# Patient Record
Sex: FEMALE | Race: AMERICAN INDIAN OR ALASKA NATIVE
[De-identification: names, ages, dates, MRNs, and addresses within clinical notes are randomized per-mention and may not be internally consistent; named-entity substitution may affect disease eponyms.]

---

## 2018-03-21 ENCOUNTER — HOSPITAL ENCOUNTER (EMERGENCY)
Dept: HOSPITAL 5 - ED | Age: 33
LOS: 1 days | Discharge: LEFT BEFORE DISCHARGE | End: 2018-03-22
Payer: SELF-PAY

## 2018-03-21 VITALS — DIASTOLIC BLOOD PRESSURE: 93 MMHG | SYSTOLIC BLOOD PRESSURE: 167 MMHG

## 2018-03-21 DIAGNOSIS — R10.9: Primary | ICD-10-CM

## 2018-03-21 LAB
ALBUMIN SERPL-MCNC: 3.7 G/DL (ref 3.9–5)
ALT SERPL-CCNC: 13 UNITS/L (ref 7–56)
BASOPHILS # (AUTO): 0.1 K/MM3 (ref 0–0.1)
BASOPHILS NFR BLD AUTO: 0.9 % (ref 0–1.8)
BUN SERPL-MCNC: 9 MG/DL (ref 7–17)
BUN/CREAT SERPL: 13 %
CALCIUM SERPL-MCNC: 8.8 MG/DL (ref 8.4–10.2)
EOSINOPHIL # BLD AUTO: 0.2 K/MM3 (ref 0–0.4)
EOSINOPHIL NFR BLD AUTO: 3.9 % (ref 0–4.3)
HCT VFR BLD CALC: 36.6 % (ref 30.3–42.9)
HEMOLYSIS INDEX: 38
HGB BLD-MCNC: 12.1 GM/DL (ref 10.1–14.3)
LYMPHOCYTES # BLD AUTO: 1.9 K/MM3 (ref 1.2–5.4)
LYMPHOCYTES NFR BLD AUTO: 31.3 % (ref 13.4–35)
MCH RBC QN AUTO: 29 PG (ref 28–32)
MCHC RBC AUTO-ENTMCNC: 33 % (ref 30–34)
MCV RBC AUTO: 87 FL (ref 79–97)
MONOCYTES # (AUTO): 0.4 K/MM3 (ref 0–0.8)
MONOCYTES % (AUTO): 7 % (ref 0–7.3)
PLATELET # BLD: 341 K/MM3 (ref 140–440)
RBC # BLD AUTO: 4.18 M/MM3 (ref 3.65–5.03)

## 2018-03-21 PROCEDURE — 36415 COLL VENOUS BLD VENIPUNCTURE: CPT

## 2018-03-21 PROCEDURE — 85025 COMPLETE CBC W/AUTO DIFF WBC: CPT

## 2018-03-21 PROCEDURE — 80053 COMPREHEN METABOLIC PANEL: CPT

## 2018-03-21 PROCEDURE — 81001 URINALYSIS AUTO W/SCOPE: CPT

## 2018-03-21 PROCEDURE — 84703 CHORIONIC GONADOTROPIN ASSAY: CPT

## 2018-03-22 LAB
BILIRUB UR QL STRIP: (no result)
BLOOD UR QL VISUAL: (no result)
MUCOUS THREADS #/AREA URNS HPF: (no result) /HPF
PH UR STRIP: 6 [PH] (ref 5–7)
PROT UR STRIP-MCNC: (no result) MG/DL
RBC #/AREA URNS HPF: 3 /HPF (ref 0–6)
UROBILINOGEN UR-MCNC: 4 MG/DL (ref ?–2)
WBC #/AREA URNS HPF: 4 /HPF (ref 0–6)

## 2019-06-12 ENCOUNTER — HOSPITAL ENCOUNTER (EMERGENCY)
Dept: HOSPITAL 5 - ED | Age: 34
Discharge: HOME | End: 2019-06-12
Payer: MEDICAID

## 2019-06-12 VITALS — DIASTOLIC BLOOD PRESSURE: 95 MMHG | SYSTOLIC BLOOD PRESSURE: 150 MMHG

## 2019-06-12 DIAGNOSIS — O99.331: ICD-10-CM

## 2019-06-12 DIAGNOSIS — K05.10: ICD-10-CM

## 2019-06-12 DIAGNOSIS — Z3A.01: ICD-10-CM

## 2019-06-12 DIAGNOSIS — O99.611: Primary | ICD-10-CM

## 2019-06-12 DIAGNOSIS — K02.9: ICD-10-CM

## 2019-06-12 DIAGNOSIS — O10.911: ICD-10-CM

## 2019-06-12 PROCEDURE — 99282 EMERGENCY DEPT VISIT SF MDM: CPT

## 2019-06-12 NOTE — EMERGENCY DEPARTMENT REPORT
ED ENT HPI





- General


Chief complaint: Dental/Oral


Stated complaint: 5WKS PREGNANT/TOOTHACHE


Time Seen by Provider: 06/12/19 18:08


Source: patient


Mode of arrival: Ambulatory


Limitations: No Limitations





- History of Present Illness


Initial comments: 





This is a 34-year-old female that is 5 weeks pregnant nontoxic well in 

appearance with no signs of distress presents to the ED with complaint of 

toothache.  Patient denies any facial swelling.  Patient denies any vaginal 

bleeding, abdominal pain, or pelvic pain.  Denies following up with a dentist.  

Denies any fever, chills, headache, nausea, vomiting, chest pain or SOB.  Denies

any other complaints.  Denies any allergies.





MD complaint: tooth pain


-: week(s) (5)


Location: tooth #


Severity: mild


Severity scale (0 -10): 8


Quality: aching


Consistency: constant


Improves with: none


Worsens with: none


Associated Symptoms: gum swelling, toothache.  denies: fever, cough, pain with 

swallowing, sore throat, tinnitus, hearing loss, discharge from ear, rhinorrhea





- Related Data


                                  Previous Rx's











 Medication  Instructions  Recorded  Last Taken  Type


 


Acetaminophen/Codeine [Tylenol 1 tab PO Q6H PRN #10 tab 02/16/18 Unknown Rx





/Codeine # 3 tab]    


 


Amoxicillin [Amoxicillin TAB] 875 mg PO BID #20 tablet 02/16/18 Unknown Rx


 


Fluconazole [Diflucan TAB] 150 mg PO ONCE #1 tablet 02/16/18 Unknown Rx


 


Ibuprofen [Motrin] 800 mg PO Q8HR PRN #30 tablet 02/16/18 Unknown Rx


 


Acetaminophen [Acetaminophen 8 650 mg PO Q8H PRN #20 tablet.er 06/12/19 Unknown 

Rx





Hour]    


 


Amoxicillin [Amoxicillin TAB] 875 mg PO BID #20 tablet 06/12/19 Unknown Rx











                                    Allergies











Allergy/AdvReac Type Severity Reaction Status Date / Time


 


banana Allergy  Itching Verified 02/16/18 15:01


 


seafood Allergy  Itching Uncoded 02/16/18 15:01














ED Dental HPI





- General


Chief complaint: Dental/Oral


Stated complaint: 5WKS PREGNANT/TOOTHACHE


Time Seen by Provider: 06/12/19 18:08


Source: patient


Mode of arrival: Ambulatory


Limitations: No Limitations





- Related Data


                                  Previous Rx's











 Medication  Instructions  Recorded  Last Taken  Type


 


Acetaminophen/Codeine [Tylenol 1 tab PO Q6H PRN #10 tab 02/16/18 Unknown Rx





/Codeine # 3 tab]    


 


Amoxicillin [Amoxicillin TAB] 875 mg PO BID #20 tablet 02/16/18 Unknown Rx


 


Fluconazole [Diflucan TAB] 150 mg PO ONCE #1 tablet 02/16/18 Unknown Rx


 


Ibuprofen [Motrin] 800 mg PO Q8HR PRN #30 tablet 02/16/18 Unknown Rx


 


Acetaminophen [Acetaminophen 8 650 mg PO Q8H PRN #20 tablet.er 06/12/19 Unknown 

Rx





Hour]    


 


Amoxicillin [Amoxicillin TAB] 875 mg PO BID #20 tablet 06/12/19 Unknown Rx











                                    Allergies











Allergy/AdvReac Type Severity Reaction Status Date / Time


 


banana Allergy  Itching Verified 02/16/18 15:01


 


seafood Allergy  Itching Uncoded 02/16/18 15:01














ED Review of Systems


ROS: 


Stated complaint: 5WKS PREGNANT/TOOTHACHE


Other details as noted in HPI





Constitutional: denies: chills, fever


Eyes: denies: eye pain, eye discharge, vision change


ENT: dental pain.  denies: ear pain, throat pain


Respiratory: denies: cough, shortness of breath, wheezing


Cardiovascular: denies: chest pain, palpitations


Endocrine: no symptoms reported


Gastrointestinal: denies: abdominal pain, nausea, diarrhea


Genitourinary: denies: urgency, dysuria, discharge


Musculoskeletal: denies: back pain, joint swelling, arthralgia


Skin: denies: rash, lesions


Neurological: denies: headache, weakness, paresthesias


Psychiatric: denies: anxiety, depression


Hematological/Lymphatic: denies: easy bleeding, easy bruising





ED Past Medical Hx





- Past Medical History


Previous Medical History?: Yes


Hx Hypertension: Yes





- Surgical History


Past Surgical History?: Yes


Additional Surgical History: C/S, carpal jian release, hernia repair





- Social History


Smoking Status: Current Every Day Smoker


Substance Use Type: None





- Medications


Home Medications: 


                                Home Medications











 Medication  Instructions  Recorded  Confirmed  Last Taken  Type


 


Acetaminophen/Codeine [Tylenol 1 tab PO Q6H PRN #10 tab 02/16/18  Unknown Rx





/Codeine # 3 tab]     


 


Amoxicillin [Amoxicillin TAB] 875 mg PO BID #20 tablet 02/16/18  Unknown Rx


 


Fluconazole [Diflucan TAB] 150 mg PO ONCE #1 tablet 02/16/18  Unknown Rx


 


Ibuprofen [Motrin] 800 mg PO Q8HR PRN #30 tablet 02/16/18  Unknown Rx


 


Acetaminophen [Acetaminophen 8 650 mg PO Q8H PRN #20 tablet.er 06/12/19  Unknown

 Rx





Hour]     


 


Amoxicillin [Amoxicillin TAB] 875 mg PO BID #20 tablet 06/12/19  Unknown Rx














ED Physical Exam





- General


Limitations: No Limitations


General appearance: alert, in no apparent distress





- Head


Head exam: Present: atraumatic, normocephalic





- Eye


Eye exam: Present: normal appearance





- Expanded ENT Exam


  ** Expanded


Ear exam: Present: normal external inspection


Mouth exam: Present: normal external inspection.  Absent: drooling, trismus, 

muffled voice


Teeth exam: Present: dental caries, fractured tooth #, dental tenderness #, 

gingival enlargement, other (no facial swelling.)





                            __________________________














                            __________________________





 1 - Fractured, Dental Tenderness





Throat exam: Positive: normal inspection, other (uvula midline).  Negative: 

tonsillar erythema, tonsillomegaly, tonsillar exudate, R peritonsillar mass, L 

peritonsillar mass





- Neck


Neck exam: Present: normal inspection, full ROM.  Absent: tenderness, 

meningismus, lymphadenopathy





- Extremities Exam


Extremities exam: Present: normal inspection, full ROM





- Back Exam


Back exam: Present: normal inspection, full ROM





- Neurological Exam


Neurological exam: Present: alert, oriented X3





- Psychiatric


Psychiatric exam: Present: normal affect, normal mood





- Skin


Skin exam: Present: warm, dry, intact, normal color.  Absent: rash





ED Course





- Reevaluation(s)


Reevaluation #1: 





06/12/19 18:14


Patient is speaking in full sentences with no signs of distress noted.





ED Medical Decision Making





- Medical Decision Making





Patient was instructed to Follow-up with a dentist doctor in 3-5 days or if 

symptoms worsen and continue return to emergency room as soon as possible.  At 

time of discharge, the patient does not seem toxic or ill in appearance.  No 

acute signs of distress noted.  Patient agrees to discharge treatment plan of 

care.  No further questions noted by the patient.





Critical care attestation.: 


If time is entered above; I have spent that time in minutes in the direct care 

of this critically ill patient, excluding procedure time.








ED Disposition


Clinical Impression: 


 Dental caries, Gingivitis





Disposition: DC-01 TO HOME OR SELFCARE


Is pt being admited?: No


Does the pt Need Aspirin: No


Condition: Stable


Instructions:  Dental Caries (ED), Gingivitis (ED)


Additional Instructions: 


Follow-up with a dentist doctor in 3-5 days or if symptoms worsen and continue 

return to emergency room as soon as possible.  


Prescriptions: 


Acetaminophen [Acetaminophen 8 Hour] 650 mg PO Q8H PRN #20 tablet.er


 PRN Reason: Pain , Severe (7-10)


Amoxicillin [Amoxicillin TAB] 875 mg PO BID #20 tablet


Referrals: 


PRIMARY CARE,MD [Referring] - 3-5 Days


GEOFF GUTIERREZ MD [Staff Physician] - 3-5 Days


[facility] [Outside] - 3-5 Days


Forms:  Work/School Release Form(ED)

## 2019-12-09 ENCOUNTER — HOSPITAL ENCOUNTER (OUTPATIENT)
Dept: HOSPITAL 5 - TRG | Age: 34
Discharge: HOME | End: 2019-12-09
Attending: OBSTETRICS & GYNECOLOGY
Payer: MEDICAID

## 2019-12-09 VITALS — DIASTOLIC BLOOD PRESSURE: 72 MMHG | SYSTOLIC BLOOD PRESSURE: 127 MMHG

## 2019-12-09 DIAGNOSIS — O36.8310: Primary | ICD-10-CM

## 2019-12-09 DIAGNOSIS — O26.893: ICD-10-CM

## 2019-12-09 DIAGNOSIS — Z3A.31: ICD-10-CM

## 2019-12-09 DIAGNOSIS — R51: ICD-10-CM

## 2019-12-09 LAB
ALT SERPL-CCNC: 24 UNITS/L (ref 7–56)
BACTERIA #/AREA URNS HPF: (no result) /HPF
BILIRUB UR QL STRIP: (no result)
BLOOD UR QL VISUAL: (no result)
HCT VFR BLD CALC: 32.9 % (ref 30.3–42.9)
HGB BLD-MCNC: 10.8 GM/DL (ref 10.1–14.3)
MCHC RBC AUTO-ENTMCNC: 33 % (ref 30–34)
MCV RBC AUTO: 91 FL (ref 79–97)
MUCOUS THREADS #/AREA URNS HPF: (no result) /HPF
PH UR STRIP: 6 [PH] (ref 5–7)
PLATELET # BLD: 257 K/MM3 (ref 140–440)
PROT UR STRIP-MCNC: (no result) MG/DL
RBC # BLD AUTO: 3.62 M/MM3 (ref 3.65–5.03)
RBC #/AREA URNS HPF: 1 /HPF (ref 0–6)
URATE SERPL-MCNC: 4.8 MG/DL (ref 3.5–7.6)
UROBILINOGEN UR-MCNC: < 2 MG/DL (ref ?–2)
WBC #/AREA URNS HPF: 1 /HPF (ref 0–6)

## 2019-12-09 PROCEDURE — 81001 URINALYSIS AUTO W/SCOPE: CPT

## 2019-12-09 PROCEDURE — 82565 ASSAY OF CREATININE: CPT

## 2019-12-09 PROCEDURE — 84460 ALANINE AMINO (ALT) (SGPT): CPT

## 2019-12-09 PROCEDURE — 84550 ASSAY OF BLOOD/URIC ACID: CPT

## 2019-12-09 PROCEDURE — 59025 FETAL NON-STRESS TEST: CPT

## 2019-12-09 PROCEDURE — 36415 COLL VENOUS BLD VENIPUNCTURE: CPT

## 2019-12-09 PROCEDURE — 83615 LACTATE (LD) (LDH) ENZYME: CPT

## 2019-12-09 PROCEDURE — 84450 TRANSFERASE (AST) (SGOT): CPT

## 2019-12-09 PROCEDURE — 85027 COMPLETE CBC AUTOMATED: CPT

## 2020-05-16 ENCOUNTER — HOSPITAL ENCOUNTER (EMERGENCY)
Dept: HOSPITAL 5 - ED | Age: 35
Discharge: HOME | End: 2020-05-16
Payer: MEDICAID

## 2020-05-16 VITALS — DIASTOLIC BLOOD PRESSURE: 91 MMHG | SYSTOLIC BLOOD PRESSURE: 173 MMHG

## 2020-05-16 DIAGNOSIS — Z79.899: ICD-10-CM

## 2020-05-16 DIAGNOSIS — Z91.013: ICD-10-CM

## 2020-05-16 DIAGNOSIS — I11.0: ICD-10-CM

## 2020-05-16 DIAGNOSIS — K02.9: Primary | ICD-10-CM

## 2020-05-16 DIAGNOSIS — I50.9: ICD-10-CM

## 2020-05-16 DIAGNOSIS — Z91.018: ICD-10-CM

## 2020-05-16 DIAGNOSIS — Z88.6: ICD-10-CM

## 2020-05-16 DIAGNOSIS — Z91.040: ICD-10-CM

## 2020-05-16 DIAGNOSIS — Z88.8: ICD-10-CM

## 2020-05-16 DIAGNOSIS — M19.90: ICD-10-CM

## 2020-05-16 DIAGNOSIS — Z98.890: ICD-10-CM

## 2020-05-16 PROCEDURE — 99282 EMERGENCY DEPT VISIT SF MDM: CPT
